# Patient Record
Sex: FEMALE | Race: WHITE
[De-identification: names, ages, dates, MRNs, and addresses within clinical notes are randomized per-mention and may not be internally consistent; named-entity substitution may affect disease eponyms.]

---

## 2017-07-10 ENCOUNTER — HOSPITAL ENCOUNTER (EMERGENCY)
Dept: HOSPITAL 25 - UCCORT | Age: 82
Discharge: HOME | End: 2017-07-10
Payer: MEDICARE

## 2017-07-10 VITALS — DIASTOLIC BLOOD PRESSURE: 62 MMHG | SYSTOLIC BLOOD PRESSURE: 156 MMHG

## 2017-07-10 DIAGNOSIS — S46.212A: Primary | ICD-10-CM
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
## 2017-07-10 DIAGNOSIS — Z87.891: ICD-10-CM
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
## 2017-07-10 PROCEDURE — G0463 HOSPITAL OUTPT CLINIC VISIT: HCPCS

## 2017-07-10 PROCEDURE — 99212 OFFICE O/P EST SF 10 MIN: CPT

## 2017-07-10 NOTE — UC
Upper Extremity HPI





- HPI Summary


HPI Summary: 





pt presents with c/o left upper arm pain that was sudden after lifting a heavy 

box. Pt reports feeling of sudden "snap, pain" and "balling up" of left upper 

bicep.   





- History of Current Complaint


Chief Complaint: UCUpperExtremity


Stated Complaint: LEFT ARM INJURY


Time Seen by Provider: 07/10/17 14:57


Hx Obtained From: Patient


Hx Last Menstrual Period: n/a


Pregnant?: No


Onset/Duration: Sudden Onset, Still Present


Severity Initially: Moderate


Severity Currently: Mild


Location Of Pain: Is Discrete @ - left upper arm/bicep


Character: Dull, Aching


Aggravating Factor(s): Movement, Lifting, Flexion, Extension, Internal/External 

Rotation, Abduction, Adduction


Alleviating Factor(s): Rest


Related History: Dominant Hand Right





- Risk Factors


DVT Risk Factors: Recent Travel





- Allergies/Home Medications


Allergies/Adverse Reactions: 


 Allergies











Allergy/AdvReac Type Severity Reaction Status Date / Time


 


No Known Allergies Allergy   Verified 07/10/17 14:55














PMH/Surg Hx/FS Hx/Imm Hx


Previously Healthy: Yes





- Surgical History


Surgical History: Yes


Surgery Procedure, Year, and Place: 1995 APPENDECTOMY, [place], [place].  1990'S 

UMBILICAL HERNIA REPAIR, [facility].  2000 LEFT TOTAL KNEE REPLACEMENT, [facility].  2004 RIGHT TOTAL KNEE REPLACEMENT, [place].  2008 RIGHT TOTAL HIP 

REPLACEMENT, [facility] , Rt Breast Lumpectomy 2009- 

[place].  2009 RIGHT BREAST LUMPECTOMY, [place].  2011 BILATERAL GROIN HERNIA (1 

WAS "FAT"), [facility].  2013 BACK SURGERY FUSION OF LOWER SPINE BONE, [facility].  SURGERY TO REPAIR WRINKLE RETINA.  TUBAL LIGATION,





- Family History


Known Family History: Positive: Cardiac Disease





- Social History


Alcohol Use: Occasionally


Substance Use Type: None


Smoking Status (MU): Former Smoker





Review of Systems


Constitutional: Negative


Skin: Negative


Eyes: Negative


ENT: Negative


Respiratory: Negative


Cardiovascular: Negative


Gastrointestinal: Negative


Genitourinary: Negative


Motor: Decreased ROM - left elbow and upper extremity, Weakness - left upper 

extremity


Neurovascular: Negative


Musculoskeletal: Arthralgia, Decreased ROM - left elbow, upper extremity, 

Myalgia, Other: - positive "popye" sign in left upper arm/bicep


Neurological: Negative


Psychological: Negative


All Other Systems Reviewed And Are Negative: Yes





Physical Exam


Triage Information Reviewed: Yes


Appearance: Well-Appearing


Vital Signs: 


 Initial Vital Signs











Temp  97.7 F   07/10/17 14:51


 


Pulse  86   07/10/17 14:51


 


Resp  14   07/10/17 14:51


 


BP  156/62   07/10/17 14:51


 


Pulse Ox  96   07/10/17 14:51











Eye Exam: Normal


Respiratory Exam: Normal


Respiratory: Positive: No respiratory distress


Musculoskeletal: Positive: Strength Limited @ - left upper extremity, ROM 

Limited @ - left elbow, and upper extremity, Other: - positive popye sign left 

upper arm/bicep.  Pt tenderness at anterior humeral head


Neurological Exam: Normal


Psychological Exam: Normal


Skin Exam: Normal





Upper Extremity Course/Dx





- Course


Course Of Treatment: I discussed with the pt the need to follow up with Dr. Eldridge as soon as possible for orthopedic evaluation.  Pt verbalized 

understanding and agreed to plan of care.





- Differential Dx/Diagnosis


Differential Diagnosis/HQI/PQRI: Strain, Sprain


Provider Diagnoses: left biceps tendon rupture.





Discharge





- Discharge Plan


Condition: Stable


Disposition: HOME


Patient Education Materials:  Tendon Rupture (ED)


Referrals: 


Brannon Parish MD [Primary Care Provider] - 


Moshe Eldridge MD [Medical Doctor] - 


Additional Instructions: 


Please go directly to Dr. Eldridge office upon release from urgent care.

## 2018-02-02 ENCOUNTER — HOSPITAL ENCOUNTER (EMERGENCY)
Dept: HOSPITAL 25 - UCCORT | Age: 83
Discharge: HOME | End: 2018-02-02
Payer: MEDICARE

## 2018-02-02 VITALS — DIASTOLIC BLOOD PRESSURE: 66 MMHG | SYSTOLIC BLOOD PRESSURE: 148 MMHG

## 2018-02-02 DIAGNOSIS — Z72.89: ICD-10-CM

## 2018-02-02 DIAGNOSIS — Z87.891: ICD-10-CM

## 2018-02-02 DIAGNOSIS — J40: ICD-10-CM

## 2018-02-02 DIAGNOSIS — J09.X2: Primary | ICD-10-CM

## 2018-02-02 PROCEDURE — 87502 INFLUENZA DNA AMP PROBE: CPT

## 2018-02-02 PROCEDURE — G0463 HOSPITAL OUTPT CLINIC VISIT: HCPCS

## 2018-02-02 PROCEDURE — 99212 OFFICE O/P EST SF 10 MIN: CPT

## 2018-02-02 NOTE — UC
FLU HPI





- HPI Summary


HPI Summary: 





P tc/o sudden onset of fever, chills, body aches and cough X 2 days. 





- History of Current Complaint


Chief Complaint: UCGeneralIllness


Stated Complaint: FLU LIKE SYMPTOMS


Time Seen by Provider: 02/02/18 11:14


Hx Obtained From: Patient


Hx Last Menstrual Period: n/a


Pregnant?: No


Onset/Duration: Sudden Onset, Lasting Days


Severity Currently: Mild


Severity Initially: Moderate


Pain Intensity: 0


Associated Signs & Symptoms: Positive: Fever, Myalgia, Nasal Congestion, 

Headache


Related Hx: Possible Flu/Infectious Exposure





- Risk Factors


Influenza Risk Factors: Age 66 y/o or Older





- Allergy/Home Medications


Allergies/Adverse Reactions: 


 Allergies











Allergy/AdvReac Type Severity Reaction Status Date / Time


 


No Known Allergies Allergy   Verified 02/02/18 10:09














PMH/Surg Hx/FS Hx/Imm Hx


Previously Healthy: Yes





- Surgical History


Surgical History: Yes


Surgery Procedure, Year, and Place: 1995 APPENDECTOMY, [place], [place].  1990'S 

UMBILICAL HERNIA REPAIR, [facility].  2000 LEFT TOTAL KNEE REPLACEMENT, [facility].  2004 RIGHT TOTAL KNEE REPLACEMENT, [place].  2008 RIGHT TOTAL HIP 

REPLACEMENT, [facility] , Rt Breast Lumpectomy 2009- 

[place].  2009 RIGHT BREAST LUMPECTOMY, [place].  2011 BILATERAL GROIN HERNIA (1 

WAS "FAT"), [facility].  2013 BACK SURGERY FUSION OF LOWER SPINE BONE, [facility].  SURGERY TO REPAIR WRINKLE RETINA.  TUBAL LIGATION,





- Family History


Known Family History: Positive: Cardiac Disease





- Social History


Occupation: Retired


Lives: With Family


Alcohol Use: Occasionally


Substance Use Type: None


Smoking Status (MU): Former Smoker


Have You Smoked in the Last Year: No





Review of Systems


Constitutional: Fever, Chills, Fatigue


Skin: Negative


Eyes: Negative


ENT: Sinus Congestion


Respiratory: Cough


Cardiovascular: Negative


Gastrointestinal: Negative


Genitourinary: Negative


Motor: Negative


Neurovascular: Negative


Musculoskeletal: Myalgia


Neurological: Headache


Psychological: Negative


Is Patient Immunocompromised?: No


All Other Systems Reviewed And Are Negative: Yes





Physical Exam


Triage Information Reviewed: Yes


Appearance: Ill-Appearing


Vital Signs: 


 Initial Vital Signs











Temp  99.1 F   02/02/18 10:06


 


Pulse  90   02/02/18 10:06


 


Resp  18   02/02/18 10:06


 


BP  148/66   02/02/18 10:06


 


Pulse Ox  97   02/02/18 10:06











Vital Signs Reviewed: Yes


Eye Exam: Normal


ENT Exam: Other


ENT: Positive: Nasal congestion


Dental Exam: Normal


Neck exam: Normal


Respiratory Exam: Other


Respiratory: Positive: Wheezing


Cardiovascular Exam: Normal


Musculoskeletal Exam: Normal


Neurological Exam: Normal


Psychological Exam: Normal


Skin Exam: Normal





Diagnostics





- Laboratory


Diagnostic Studies Completed/Ordered: Rapid influenza positive: A





Flu Course/Dx





- Course


Course Of Treatment: Rapid influenza positive: A





- Differential Dx/Diagnosis


Differential Diagnosis/HQI/PQRI: Bronchitis, Influenza


Provider Diagnoses: Influenza A.  Bronchitis





Discharge





- Discharge Plan


Condition: Stable


Disposition: HOME


Prescriptions: 


Benzonatate CAP* [Tessalon 100 MG CAP*] 100 mg PO Q8H PRN #30 cap


 PRN Reason: Cough


DOXYcycline CAP(*) [DOXYcycline 100MG CAP(*)] 100 mg PO Q12H #20 cap


Oseltamivir CAP* [Tamiflu CAP*] 75 mg PO Q12H #10 cap


predniSONE TAB* [Deltasone TAB*] 30 mg PO DAILY #9 tab


Patient Education Materials:  Influenza (ED), Acute Bronchitis (ED)


Referrals: 


Brannon Parish MD [Primary Care Provider] - If Needed

## 2019-01-02 ENCOUNTER — HOSPITAL ENCOUNTER (EMERGENCY)
Dept: HOSPITAL 25 - UCCORT | Age: 84
Discharge: HOME | End: 2019-01-02
Payer: MEDICARE

## 2019-01-02 VITALS — SYSTOLIC BLOOD PRESSURE: 145 MMHG | DIASTOLIC BLOOD PRESSURE: 78 MMHG

## 2019-01-02 DIAGNOSIS — Z87.891: ICD-10-CM

## 2019-01-02 DIAGNOSIS — J32.9: ICD-10-CM

## 2019-01-02 DIAGNOSIS — J20.9: Primary | ICD-10-CM

## 2019-01-02 PROCEDURE — 99212 OFFICE O/P EST SF 10 MIN: CPT

## 2019-01-02 PROCEDURE — G0463 HOSPITAL OUTPT CLINIC VISIT: HCPCS

## 2019-01-02 NOTE — UC
FLU HPI





- HPI Summary


HPI Summary: 





86 year old female with PMH  + for elevated chol, on vitamins/ chol med, no 

recent sicknesses/ abx use presents with ~ 5 day h/o sinus congestion, cough- 

productive at times.   no fever, chills, no new body aches, no throat/ ear 

pain.    no GI symptoms.    





- History of Current Complaint


Chief Complaint: UCRespiratory


Stated Complaint: COUGH CONGESTION


Time Seen by Provider: 01/02/19 13:14


Hx Obtained From: Patient


Hx Last Menstrual Period: n/a


Pregnant?: No


Onset/Duration: Sudden Onset, Lasting Days


Severity Currently: Mild


Severity Initially: Moderate


Pain Intensity: 5


Pain Scale Used: 0-10 Numeric


Associated Signs & Symptoms: Positive: Cough, Nasal Congestion.  Negative: Fever

, T Max, F/C, Sore Throat, Vomiting, Diarrhea





- Allergy/Home Medications


Allergies/Adverse Reactions: 


 Allergies











Allergy/AdvReac Type Severity Reaction Status Date / Time


 


No Known Allergies Allergy   Verified 01/02/19 13:14














PMH/Surg Hx/FS Hx/Imm Hx


Previously Healthy: Yes - elevated cholesterol 





- Surgical History


Surgical History: Yes


Surgery Procedure, Year, and Place: 1995 APPENDECTOMY, [place], [place].  1990'S 

UMBILICAL HERNIA REPAIR, [facility].  2000 LEFT TOTAL KNEE REPLACEMENT, [facility].  2004 RIGHT TOTAL KNEE REPLACEMENT, [place].  2008 RIGHT TOTAL HIP 

REPLACEMENT, [facility] ,.  2009 RIGHT BREAST 

LUMPECTOMY, [place].  2011 BILATERAL GROIN HERNIA (1 WAS "FAT"), [facility].  2013 

BACK SURGERY FUSION OF LOWER SPINE BONE, [facility].  

SURGERY TO REPAIR WRINKLE RETINA.  TUBAL LIGATION,





- Family History


Known Family History: Positive: Cardiac Disease





- Social History


Alcohol Use: Occasionally


Substance Use Type: None


Smoking Status (MU): Former Smoker


Have You Smoked in the Last Year: No





Review of Systems


All Other Systems Reviewed And Are Negative: Yes


Constitutional: Positive: Negative, Fatigue.  Negative: Fever, Chills


Skin: Positive: Negative


Eyes: Positive: Negative


ENT: Positive: Nasal Discharge, Sinus Congestion, Sinus Pain/Tenderness.  

Negative: Sore Throat, Ear Ache


Respiratory: Positive: Cough


Is Patient Immunocompromised?: No





Physical Exam


Triage Information Reviewed: Yes


Appearance: No Pain Distress, Well-Nourished, Ill-Appearing - minimal


Vital Signs: 


 Initial Vital Signs











Temp  97.9 F   01/02/19 13:15


 


Pulse  79   01/02/19 13:15


 


Resp  18   01/02/19 13:15


 


BP  145/78   01/02/19 13:15


 


Pulse Ox  98   01/02/19 13:15











Eyes: Positive: Conjunctiva Clear


ENT: Positive: Pharynx normal, TMs normal - mild fluid posterior TM b/l, Sinus 

tenderness - frontal, max b/l, Uvula midline.  Negative: Tonsillar swelling, 

Tonsillar exudate, Hoarse voice, Dental tenderness


Neck: Positive: Supple, Nontender, No Lymphadenopathy


Respiratory: Positive: Chest non-tender, Lungs clear, Normal breath sounds, No 

respiratory distress, No accessory muscle use, Wheezing - bronchus


Cardiovascular: Positive: RRR, Pulses Normal





Flu Course/Dx





- Course


Course Of Treatment: rapid flu- negative, tx for sinusitis, acute bronchitis, 

abx given, follow up with pcp





- Differential Dx/Diagnosis


Differential Diagnosis/HQI/PQRI: Bronchitis, Broncholiolitis


Provider Diagnosis: 


 Acute bronchitis, Sinusitis








Discharge





- Sign-Out/Discharge


Documenting (check all that apply): Patient Departure


All imaging exams completed and their final reports reviewed: No Studies





- Discharge Plan


Condition: Good


Disposition: HOME


Prescriptions: 


Amoxicillin PO (*) [Amoxicillin 500 MG CAP*] 500 mg PO TID #21 cap


Patient Education Materials:  Acute Bronchitis (ED)


Referrals: 


Brannon Parish MD [Primary Care Provider] - 


Additional Instructions: 


- FOllow up with primary physician within 2-3 days if no improvement 


- Antibiotics as directed 


- Increase fluid intake 


- GO to ER with lightheadedness, headache, increased symptoms, fever > 101 


- over the counter medications for symptoms- tylenol for headache. 








- Billing Disposition and Condition


Condition: GOOD


Disposition: Home





- Attestation Statements


Provider Attestation: 





I was available for consult. This patient was seen by the KEVIN. The patient was 

not presented to, seen by, or examined by me. -Sari

## 2019-07-25 ENCOUNTER — HOSPITAL ENCOUNTER (EMERGENCY)
Dept: HOSPITAL 25 - UCCORT | Age: 84
Discharge: HOME | End: 2019-07-25
Payer: MEDICARE

## 2019-07-25 VITALS — SYSTOLIC BLOOD PRESSURE: 147 MMHG | DIASTOLIC BLOOD PRESSURE: 71 MMHG

## 2019-07-25 DIAGNOSIS — J06.9: Primary | ICD-10-CM

## 2019-07-25 DIAGNOSIS — Z98.1: ICD-10-CM

## 2019-07-25 PROCEDURE — 99211 OFF/OP EST MAY X REQ PHY/QHP: CPT

## 2019-07-25 PROCEDURE — 71046 X-RAY EXAM CHEST 2 VIEWS: CPT

## 2019-07-25 PROCEDURE — G0463 HOSPITAL OUTPT CLINIC VISIT: HCPCS

## 2019-07-25 NOTE — UC
Respiratory Complaint HPI





- HPI Summary


HPI Summary: 





87 year old with multiple compaints. Chief complaint of unproductive cough for 

the past 7-10 days that has been getting progressively worse. Pt reports fatigue

, some body aches, some wheezing, hoarse voice. Also had diarrhea in the past 

week that is now resolved. Cough worsening since her trip to  to see family. 

No sinus congestion. 





Pt felt it necessary to mention that fell 7/16, she hit her head, her L leg/

knee - pt went to ER, diagnostic imaging was unremarkable. 


[ End ]





- History of Current Complaint


Chief Complaint: UCGeneralIllness


Stated Complaint: ACHY,COUGH,CHEST CONGESTION


Time Seen by Provider: 07/25/19 08:46


Hx Obtained From: Patient


Hx Last Menstrual Period: n/a


Pregnant?: No


Onset/Duration: Gradual Onset


Timing: Constant


Severity Initially: Mild


Severity Currently: Moderate


Pain Intensity: 8


Character: Cough: Nonproductive


Aggravating Factors: Exertion





- Allergies/Home Medications


Allergies/Adverse Reactions: 


 Allergies











Allergy/AdvReac Type Severity Reaction Status Date / Time


 


No Known Allergies Allergy   Verified 07/25/19 08:45











Home Medications: 


 Home Medications





Bismuth Subsalicylate [Pepto-Bismol] 30 ml PO ONCE 07/25/19 [History Confirmed 

07/25/19]











PMH/Surg Hx/FS Hx/Imm Hx


Previously Healthy: Yes


Endocrine History: Dyslipidemia





- Surgical History


Surgical History: Yes


Surgery Procedure, Year, and Place: 1995 APPENDECTOMY, [place], [place].  1990'S 

UMBILICAL HERNIA REPAIR, [facility].  2000 LEFT TOTAL KNEE REPLACEMENT, [facility].  2004 RIGHT TOTAL KNEE REPLACEMENT, [place].  2008 RIGHT TOTAL HIP 

[facility]  2009 RIGHT BREAST 

LUMPECTOMY, [place].  2011 BILATERAL GROIN HERNIA (1 WAS "FAT"), [facility].  2013 

BACK SURGERY FUSION OF LOWER SPINE BONE, [facility]  

SURGERY TO REPAIR WRINKLE RETINA.  TUBAL LIGATION,





- Family History


Known Family History: Positive: Cardiac Disease





- Social History


Occupation: Retired


Alcohol Use: Occasionally


Substance Use Type: None


Smoking Status (MU): Former Smoker


Have You Smoked in the Last Year: No





Review of Systems


All Other Systems Reviewed And Are Negative: Yes


Constitutional: Positive: Fatigue


Respiratory: Positive: Cough


Is Patient Immunocompromised?: No





Physical Exam


Triage Information Reviewed: Yes


Appearance: Well-Appearing, No Pain Distress, Well-Nourished


Vital Signs: 


 Initial Vital Signs











Temp  97.9 F   07/25/19 08:36


 


Pulse  95   07/25/19 08:36


 


Resp  18   07/25/19 08:36


 


BP  147/71   07/25/19 08:36


 


Pulse Ox  97   07/25/19 08:36











Vital Signs Reviewed: Yes


Eye Exam: Normal


ENT Exam: Normal


Dental Exam: Normal


Neck exam: Normal


Neck: Positive: 1


Respiratory Exam: Normal


Cardiovascular Exam: Normal


Musculoskeletal Exam: Normal


Neurological Exam: Normal


Psychological Exam: Normal


Skin Exam: Normal


Skin: Positive: Other - right anterior shin with moderate amounts of bruising 

with small superficial linear abrasion without discharge or drainage or 

erythema. neg Homans sign





Respiratory Course/Dx





- Course


Course Of Treatment: 





Based on numerous  complaints of recent but now resolved diarrhea, cougg, 

fatigue, neg XR and no source for bacterial infection advise to treat with 

supportive care. no stat labs available at this time for WBC and patient aware 

of our limitations . I advise if any symptoms worsen to go to ED for higher 

level of care as she declined going to ED at this time. CXR neg  





- Differential Dx/Diagnosis


Differential Diagnosis/HQI/PQRI: Bronchitis, Laryngitis, Lower Resp Infection, 

Other - URI, Virall illness


Provider Diagnosis: 


 Viral URI with cough








Discharge





- Sign-Out/Discharge


Documenting (check all that apply): Patient Departure


All imaging exams completed and their final reports reviewed: Yes





- Discharge Plan


Condition: Good


Disposition: HOME


Patient Education Materials:  Viral Syndrome (ED)


Referrals: 


Brannon Parish MD [Primary Care Provider] - 4 Days


Additional Instructions: 


As we discussed if there are any concerns for your symptoms not improving or 

worsening please go to the Emergency Room. Today your chest xray did not reveal 

any concerns  





- Billing Disposition and Condition


Condition: GOOD


Disposition: Home